# Patient Record
Sex: FEMALE | Race: WHITE | NOT HISPANIC OR LATINO | Employment: PART TIME | URBAN - METROPOLITAN AREA
[De-identification: names, ages, dates, MRNs, and addresses within clinical notes are randomized per-mention and may not be internally consistent; named-entity substitution may affect disease eponyms.]

---

## 2018-06-06 LAB
COLOGUARD RESULT REPORTABLE: NEGATIVE
EXTERNAL COLOGUARD RESULT: NEGATIVE

## 2022-02-16 ENCOUNTER — TELEPHONE (OUTPATIENT)
Dept: GASTROENTEROLOGY | Facility: CLINIC | Age: 57
End: 2022-02-16

## 2022-02-16 RX ORDER — HYDROCORTISONE 5 MG/1
5 TABLET ORAL DAILY
COMMUNITY

## 2022-02-16 RX ORDER — MECLIZINE HCL 12.5 MG/1
TABLET ORAL 3 TIMES DAILY PRN
COMMUNITY

## 2022-02-16 RX ORDER — ESZOPICLONE 1 MG/1
1 TABLET, FILM COATED ORAL
COMMUNITY

## 2022-02-24 ENCOUNTER — OFFICE VISIT (OUTPATIENT)
Dept: GASTROENTEROLOGY | Facility: CLINIC | Age: 57
End: 2022-02-24
Payer: COMMERCIAL

## 2022-02-24 VITALS
BODY MASS INDEX: 19.15 KG/M2 | HEIGHT: 67 IN | SYSTOLIC BLOOD PRESSURE: 110 MMHG | DIASTOLIC BLOOD PRESSURE: 68 MMHG | WEIGHT: 122 LBS | HEART RATE: 60 BPM

## 2022-02-24 DIAGNOSIS — R10.30 LOWER ABDOMINAL PAIN: Primary | ICD-10-CM

## 2022-02-24 DIAGNOSIS — K64.9 HEMORRHOIDS, UNSPECIFIED HEMORRHOID TYPE: ICD-10-CM

## 2022-02-24 DIAGNOSIS — Z80.0 FAMILY HISTORY OF COLON CANCER: ICD-10-CM

## 2022-02-24 DIAGNOSIS — K62.5 RECTAL BLEEDING: ICD-10-CM

## 2022-02-24 PROCEDURE — 99203 OFFICE O/P NEW LOW 30 MIN: CPT | Performed by: NURSE PRACTITIONER

## 2022-02-24 RX ORDER — THYROID 30 MG/1
30 TABLET ORAL DAILY
COMMUNITY
Start: 2022-02-16

## 2022-02-24 RX ORDER — PAROXETINE HYDROCHLORIDE 20 MG/1
20 TABLET, FILM COATED ORAL EVERY MORNING
COMMUNITY
Start: 2022-02-15

## 2022-02-24 RX ORDER — FLUCONAZOLE 100 MG/1
100 TABLET ORAL DAILY
COMMUNITY
Start: 2022-02-15

## 2022-02-24 RX ORDER — PROGESTERONE 100 MG/1
CAPSULE ORAL
COMMUNITY
Start: 2022-02-10

## 2022-02-24 NOTE — PROGRESS NOTES
[facility] - Outpatient Follow-up Note  Stella Kaiser 55 y o  female MRN: [id number]  Encounter: [id number]    ASSESSMENT AND PLAN:      1  Lower abdominal pain  Patient states she has been having lower abdominal pain  She states increased discomfort after eating  States consistency of her bowel movements have been loose to formed  States she does have some nausea with meals, denies vomiting  Consider IBS, gastritis, less likely celiacs since patient does have a gluten sensitivity and has been gluten free for quite some time  Discussed fiber intake and also FODMAP diet however will re-evaluate after colonoscopy  - schedule colonoscopy at [facility])    2  Rectal bleeding  3  Hemorrhoids, unspecified hemorrhoid type  Patient states she has had rectal bleeding with blood to the toilet tissue  She states she also has 2 external hemorrhoids which have recently been very painful  Her pain is very high at this time and would prefer to have her painful external hemorrhoids addressed prior to colonoscopy  Discussed options for treatment and she will call Dr Nelson Baird for an appointment  If she cannot get in in a reasonable amount of time she will let the office know and we will try to get her in with a [facility] practice sooner  - Ambulatory Referral to Colorectal Surgery; Future    4  Family history of colon cancer  States she has done a Cologuard in the last 2-3 years however her father did have colon cancer and she is willing to have a colonoscopy        Followup Appointment:  2-3 months, after seeing surgery for external hemorrhoid removal   ______________________________________________________________________    Chief Complaint   Patient presents with    Hemorrhoids     Referred by OZIEL Wadsworth     HPI:  15-year-old female with medical history of C difficile, Lyme disease, gluten sensitivity, dairy sensitivity rectal bleeding, external hemorrhoid pain and abdominal pain  Patient states that she has 2 external hemorrhoids that have become extremely painful  She states she does have some nausea after meals  Denies vomiting  Denies acid reflux symptoms  States she is having lower abdomen discomfort increased after eating  She states she is having loose to formed bowel movements daily  She states she does have occasional hematochezia on the toilet tissue however denies melena  Most recent lab work 01/06/2022; CBC, CMP, TSH normal   She states she has only done a Cologuard in the past 2-3 years which was negative  She does state her father did have colon cancer  Patient states she does have increased muscle skeletal pain with history of Lyme  Nonsmoker, denies ETOH, states she does medical marijuana daily mostly in either a tincture or inhaled      Historical Information   Past Medical History:   Diagnosis Date    Abnormal EKG     Adrenal insufficiency (HCC)     Anxiety     Chronic pain     Depression     Insomnia     Lyme carditis     Pericarditis      Past Surgical History:   Procedure Laterality Date    HYSTERECTOMY  2015     Social History     Substance and Sexual Activity   Alcohol Use Not Currently    Alcohol/week: 0 0 standard drinks    Comment: 1-2 daily     Social History     Substance and Sexual Activity   Drug Use Yes    Frequency: 7 0 times per week    Types: Marijuana    Comment: Pain management/sleep     Social History     Tobacco Use   Smoking Status Never Smoker   Smokeless Tobacco Never Used     Family History   Problem Relation Age of Onset    Lupus Mother     Colon cancer Father     Testicular cancer Father     Asthma Father     Colon polyps Neg Hx          Current Outpatient Medications:     [place] Thyroid 30 MG tablet    eszopiclone (LUNESTA) 1 mg tablet    fluconazole (DIFLUCAN) 100 mg tablet    hydrocortisone (CORTEF) 5 mg tablet    PARoxetine (PAXIL) 20 mg tablet    Progesterone 100 MG CAPS    meclizine (ANTIVERT) 12 5 MG tablet    progesterone (ENDOMETRIN) 100 MG vaginal insert  No Known Allergies  Reviewed medications and allergies and updated as indicated    PHYSICAL EXAM:    Blood pressure 110/68, pulse 60, height 5' 7" (1 702 m), weight 55 3 kg (122 lb)  Body mass index is 19 11 kg/m²  General Appearance: NAD, cooperative, alert  Eyes: Anicteric, PERRLA, EOMI  ENT:  Normocephalic, atraumatic, normal mucosa  Neck:  Supple, symmetrical, trachea midline  Resp:  Clear to auscultation bilaterally; no rales, rhonchi or wheezing; respirations unlabored   CV:  S1 S2, Regular rate and rhythm; no murmur, rub, or gallop  GI:  Soft, lower abdomen tenderness, non-distended; normal bowel sounds; no masses, no organomegaly   Rectal: Deferred  Musculoskeletal: No cyanosis, clubbing or edema  Normal ROM  Skin:  No jaundice, rashes, or lesions   Heme/Lymph: No palpable cervical lymphadenopathy  Psych: Normal affect, good eye contact  Neuro: No gross deficits, AAOx3    Lab Results:   No results found for: WBC, HGB, HCT, MCV, PLT  No results found for: NA, K, CL, CO2, ANIONGAP, BUN, CREATININE, GLUCOSE, GLUF, CALCIUM, CORRECTEDCA, AST, ALT, ALKPHOS, PROT, BILITOT, EGFR  No results found for: IRON, TIBC, FERRITIN  No results found for: LIPASE    Radiology Results:   No results found

## 2022-03-01 ENCOUNTER — TELEPHONE (OUTPATIENT)
Dept: GASTROENTEROLOGY | Facility: CLINIC | Age: 57
End: 2022-03-01

## 2022-03-08 ENCOUNTER — TELEPHONE (OUTPATIENT)
Dept: GASTROENTEROLOGY | Facility: CLINIC | Age: 57
End: 2022-03-08

## 2022-03-24 DIAGNOSIS — K62.5 RECTAL HEMORRHAGE: Primary | ICD-10-CM

## 2022-03-25 RX ORDER — SODIUM PICOSULFATE, MAGNESIUM OXIDE, AND ANHYDROUS CITRIC ACID 10; 3.5; 12 MG/160ML; G/160ML; G/160ML
LIQUID ORAL
Qty: 320 ML | Refills: 0 | Status: SHIPPED | OUTPATIENT
Start: 2022-03-25 | End: 2022-03-31 | Stop reason: HOSPADM

## 2022-03-25 NOTE — TELEPHONE ENCOUNTER
Scheduled date of colonoscopy (as of today):3/31/22  Physician performing colonoscopy: Dr Joy Abt  [facility] of colonoscopy[facility]  Bowel prep reviewed with patient: Clenpiq  Instructions reviewed with patient by: Kodi Daily  Clearances: none

## 2022-03-31 ENCOUNTER — ANESTHESIA EVENT (OUTPATIENT)
Dept: GASTROENTEROLOGY | Facility: AMBULATORY SURGERY CENTER | Age: 57
End: 2022-03-31

## 2022-03-31 ENCOUNTER — ANESTHESIA (OUTPATIENT)
Dept: GASTROENTEROLOGY | Facility: AMBULATORY SURGERY CENTER | Age: 57
End: 2022-03-31

## 2022-03-31 ENCOUNTER — HOSPITAL ENCOUNTER (OUTPATIENT)
Dept: GASTROENTEROLOGY | Facility: AMBULATORY SURGERY CENTER | Age: 57
Discharge: HOME | End: 2022-03-31
Payer: COMMERCIAL

## 2022-03-31 VITALS
BODY MASS INDEX: 19.03 KG/M2 | OXYGEN SATURATION: 100 % | HEART RATE: 50 BPM | WEIGHT: 121.25 LBS | SYSTOLIC BLOOD PRESSURE: 106 MMHG | HEIGHT: 67 IN | TEMPERATURE: 98.2 F | RESPIRATION RATE: 28 BRPM | DIASTOLIC BLOOD PRESSURE: 61 MMHG

## 2022-03-31 DIAGNOSIS — Z80.0 FAMILY HISTORY OF COLON CANCER: ICD-10-CM

## 2022-03-31 DIAGNOSIS — R10.30 LOWER ABDOMINAL PAIN: ICD-10-CM

## 2022-03-31 PROCEDURE — 45378 DIAGNOSTIC COLONOSCOPY: CPT | Performed by: INTERNAL MEDICINE

## 2022-03-31 RX ORDER — LIDOCAINE HYDROCHLORIDE 10 MG/ML
INJECTION, SOLUTION EPIDURAL; INFILTRATION; INTRACAUDAL; PERINEURAL AS NEEDED
Status: DISCONTINUED | OUTPATIENT
Start: 2022-03-31 | End: 2022-03-31

## 2022-03-31 RX ORDER — PROPOFOL 10 MG/ML
INJECTION, EMULSION INTRAVENOUS AS NEEDED
Status: DISCONTINUED | OUTPATIENT
Start: 2022-03-31 | End: 2022-03-31

## 2022-03-31 RX ORDER — SODIUM CHLORIDE, SODIUM LACTATE, POTASSIUM CHLORIDE, CALCIUM CHLORIDE 600; 310; 30; 20 MG/100ML; MG/100ML; MG/100ML; MG/100ML
50 INJECTION, SOLUTION INTRAVENOUS CONTINUOUS
Status: DISCONTINUED | OUTPATIENT
Start: 2022-03-31 | End: 2022-04-04 | Stop reason: HOSPADM

## 2022-03-31 RX ADMIN — PROPOFOL 30 MG: 10 INJECTION, EMULSION INTRAVENOUS at 16:59

## 2022-03-31 RX ADMIN — PROPOFOL 70 MG: 10 INJECTION, EMULSION INTRAVENOUS at 16:54

## 2022-03-31 RX ADMIN — SODIUM CHLORIDE, SODIUM LACTATE, POTASSIUM CHLORIDE, CALCIUM CHLORIDE 50 ML/HR: 600; 310; 30; 20 INJECTION, SOLUTION INTRAVENOUS at 16:08

## 2022-03-31 RX ADMIN — PROPOFOL 50 MG: 10 INJECTION, EMULSION INTRAVENOUS at 17:09

## 2022-03-31 RX ADMIN — PROPOFOL 30 MG: 10 INJECTION, EMULSION INTRAVENOUS at 17:13

## 2022-03-31 RX ADMIN — LIDOCAINE HYDROCHLORIDE 50 MG: 10 INJECTION, SOLUTION EPIDURAL; INFILTRATION; INTRACAUDAL; PERINEURAL at 16:54

## 2022-03-31 RX ADMIN — PROPOFOL 50 MG: 10 INJECTION, EMULSION INTRAVENOUS at 17:04

## 2022-03-31 NOTE — DISCHARGE INSTRUCTIONS
Colonoscopy   WHAT YOU NEED TO KNOW:   A colonoscopy is a procedure to examine the inside of your colon (intestine) with a scope  Polyps or tissue growths may have been removed during your colonoscopy  It is normal to feel bloated and to have some abdominal discomfort  You should be passing gas  If you have hemorrhoids or you had polyps removed, you may have a small amount of bleeding  DISCHARGE INSTRUCTIONS:   Seek care immediately if:    You have sudden, severe abdominal pain   You have problems swallowing   You have a large amount of black, sticky bowel movements or blood in your bowel movements   You have sudden trouble breathing   You feel weak, lightheaded, or faint or your heart beats faster than normal for you  Contact your healthcare provider if:    You have a fever and chills   You have nausea or are vomiting   Your abdomen is bloated or feels full and hard   You have abdominal pain   You have black, sticky bowel movements or blood in your bowel movements   You have not had a bowel movement for 3 days after your procedure   You have rash or hives   You have questions or concerns about your procedure  Activity:    Do not lift, strain, or run for 24 hours after your procedure   Rest after your procedure  You have been given medicine to relax you  Do not drive or make important decisions until the day after your procedure  Return to your normal activity as directed   Relieve gas and discomfort from bloating by lying on your right side with a heating pad on your abdomen  You may need to take short walks to help the gas move out  Eat small meals until bloating is relieved  Follow up with your healthcare provider as directed: Write down your questions so you remember to ask them during your visits  If you take a blood thinner, please review the specific instructions from your endoscopist about when you should resume it   These can be found in the Recommendation and Your Medication list sections of this After Visit Summary  Hemorrhoids   WHAT YOU NEED TO KNOW:   What are hemorrhoids? Hemorrhoids are swollen blood vessels inside your rectum (internal hemorrhoids) or on your anus (external hemorrhoids)  Sometimes a hemorrhoid may prolapse  This means it extends out of your anus  What increases my risk for hemorrhoids? · Pregnancy or obesity    · Straining or sitting for a long time during bowel movements    · Liver disease    · Weak muscles around the anus caused by older age, rectal surgery, or anal intercourse    · A lack of physical activity    · Chronic diarrhea or constipation    · A low-fiber diet    What are the signs and symptoms of hemorrhoids? · Pain or itching around your anus or inside your rectum    · Swelling or bumps around your anus    · Bright red blood in your bowel movement, on the toilet paper, or in the toilet bowl    · Tissue bulging out of your anus (prolapsed hemorrhoids)    · Incontinence (poor control over urine or bowel movements)    How are hemorrhoids diagnosed? Your healthcare provider will ask about your symptoms, the foods you eat, and your bowel movements  He or she will examine your anus for external hemorrhoids  You may need the following:  · A digital rectal exam  is a test to check for hemorrhoids  Your healthcare provider will put a gloved finger inside your anus to feel for the hemorrhoids  · An anoscopy  is a test that uses a scope (small tube with a light and camera on the end) to look at your hemorrhoids  How are hemorrhoids treated? Treatment will depend on your symptoms  You may need any of the following:  · Medicines  can help decrease pain and swelling, and soften your bowel movement  The medicine may be a pill, pad, cream, or ointment  · Procedures  may be used to shrink or remove your hemorrhoid  Examples include rubber-band ligation, sclerotherapy, and photocoagulation   These procedures may be done in your healthcare provider's office  Ask your healthcare provider for more information about these procedures  · Surgery  may be needed to shrink or remove your hemorrhoids  How can I manage my symptoms? · Apply ice on your anus for 15 to 20 minutes every hour or as directed  Use an ice pack, or put crushed ice in a plastic bag  Cover it with a towel before you apply it to your anus  Ice helps prevent tissue damage and decreases swelling and pain  · Take a sitz bath  Fill a bathtub with 4 to 6 inches of warm water  You may also use a sitz bath pan that fits inside a toilet bowl  Sit in the sitz bath for 15 minutes  Do this 3 times a day, and after each bowel movement  The warm water can help decrease pain and swelling  · Keep your anal area clean  Gently wash the area with warm water daily  Soap may irritate the area  After a bowel movement, wipe with moist towelettes or wet toilet paper  Dry toilet paper can irritate the area  How can I help prevent hemorrhoids? · Do not strain to have a bowel movement  Do not sit on the toilet too long  These actions can increase pressure on the tissues in your rectum and anus  · Drink plenty of liquids  Liquids can help prevent constipation  Ask how much liquid to drink each day and which liquids are best for you  · Eat a variety of high-fiber foods  Examples include fruits, vegetables, and whole grains  Ask your healthcare provider how much fiber you need each day  You may need to take a fiber supplement  · Exercise as directed  Exercise, such as walking, may make it easier to have a bowel movement  Ask your healthcare provider to help you create an exercise plan  · Do not have anal sex  Anal sex can weaken the skin around your rectum and anus  · Avoid heavy lifting  This can cause straining and increase your risk for another hemorrhoid  When should I seek immediate care?    · You have severe pain in your rectum or around your anus  · You have severe pain in your abdomen and you are vomiting  · You have bleeding from your anus that soaks through your underwear  When should I contact my healthcare provider? · You have frequent and painful bowel movements  · Your hemorrhoid looks or feels more swollen than usual      · You do not have a bowel movement for 2 days or more  · You see or feel tissue coming through your anus  · You have questions or concerns about your condition or care  CARE AGREEMENT:   You have the right to help plan your care  Learn about your health condition and how it may be treated  Discuss treatment options with your healthcare providers to decide what care you want to receive  You always have the right to refuse treatment  The above information is an  only  It is not intended as medical advice for individual conditions or treatments  Talk to your doctor, nurse or pharmacist before following any medical regimen to see if it is safe and effective for you  © Copyright [employer] 2022 Information is for End User's use only and may not be sold, redistributed or otherwise used for commercial purposes   All illustrations and images included in CareNotes® are the copyrighted property of A D A M , Inc  or [address] [place] [address] [employer]

## 2022-03-31 NOTE — ANESTHESIA PREPROCEDURE EVALUATION
Procedure:  COLONOSCOPY    Relevant Problems   No relevant active problems     Abnormal EKG    Insomnia    Pericarditis    Depression    Anxiety    Adrenal insufficiency (HCC)    Lyme carditis    Chronic pain     Migraine today- resolving with excedrin       Physical Exam    Airway    Mallampati score: II  TM Distance: >3 FB  Neck ROM: full     Dental   No notable dental hx     Cardiovascular  Cardiovascular exam normal    Pulmonary  Pulmonary exam normal     Other Findings        Anesthesia Plan  ASA Score- 3     Anesthesia Type- IV sedation with anesthesia with ASA Monitors  Additional Monitors:   Airway Plan:           Plan Factors-Exercise tolerance (METS): >4 METS  Chart reviewed  Patient summary reviewed  Patient is a current smoker (marijuana)  Induction- intravenous  Postoperative Plan-     Informed Consent- Anesthetic plan and risks discussed with patient  I personally reviewed this patient with the CRNA  Discussed and agreed on the Anesthesia Plan with the CRNA  Omayra Tucker

## 2022-03-31 NOTE — ANESTHESIA POSTPROCEDURE EVALUATION
Post-Op Assessment Note    CV Status:  Stable  Pain Score: 1    Pain management: adequate     Mental Status:  Alert and awake   Hydration Status:  Euvolemic   PONV Controlled:  Controlled   Airway Patency:  Patent      Post Op Vitals Reviewed: Yes      Staff: Anesthesiologist         No complications documented      BP   98/53   Temp      Pulse 51   Resp   12   SpO2   100

## 2022-03-31 NOTE — H&P
History and Physical - [facility]  Felipa Ibarra 55 y o  female MRN: [id number]    HPI: Felipa Ibarra is a 55y o  year old female who presents for colonoscopy to evaluate for lower abdominal pain, rectal bleeding also has family history of colorectal cancer    REVIEW OF SYSTEMS: Per the HPI, and otherwise unremarkable      Historical Information   Past Medical History:   Diagnosis Date    Abnormal EKG     Adrenal insufficiency (HCC)     Anxiety     Chronic pain     Depression     Insomnia     Lyme carditis     Migraines     Pericarditis      Past Surgical History:   Procedure Laterality Date    HYSTERECTOMY  2015     Social History   Social History     Substance and Sexual Activity   Alcohol Use Not Currently    Alcohol/week: 0 0 standard drinks    Comment: 1-2 daily     Social History     Substance and Sexual Activity   Drug Use Yes    Frequency: 7 0 times per week    Types: Marijuana    Comment: Pain management/sleep     Social History     Tobacco Use   Smoking Status Never Smoker   Smokeless Tobacco Never Used     Family History   Problem Relation Age of Onset    Lupus Mother     Colon cancer Father     Testicular cancer Father     Asthma Father     Colon polyps Neg Hx        Meds/Allergies       Current Outpatient Medications:     [place] Thyroid 30 MG tablet    hydrocortisone (CORTEF) 5 mg tablet    PARoxetine (PAXIL) 20 mg tablet    Progesterone 100 MG CAPS    Sod Picosulfate-Mag Ox-Cit Acd (Clenpiq) 10-3 5-12 MG-GM -GM/160ML SOLN    eszopiclone (LUNESTA) 1 mg tablet    fluconazole (DIFLUCAN) 100 mg tablet    meclizine (ANTIVERT) 12 5 MG tablet    progesterone (ENDOMETRIN) 100 MG vaginal insert    Current Facility-Administered Medications:     lactated ringers infusion, 50 mL/hr, Intravenous, Continuous, 50 mL/hr at 03/31/22 1608    No Known Allergies    Objective     /55   Pulse (!) 54   Temp 98 2 °F (36 8 °C) (Temporal)   Resp 16   Ht 5' 7" (1 702 m)   Wt 55 kg (121 lb 4 1 oz)   SpO2 100%   BMI 18 99 kg/m²     PHYSICAL EXAM    Gen: NAD AAOx3  Head: Normocephalic, Atraumatic  CV: S1S2 RRR no m/r/g  CHEST: Clear b/l no c/r/w  ABD: soft, +BS NT/ND  EXT: no edema    ASSESSMENT/PLAN:  This is a 55y o  year old female here for colonoscopy, and she is stable and optimized for her procedure

## 2023-09-13 LAB — HBA1C MFR BLD HPLC: 5.3 %

## 2023-11-08 ENCOUNTER — TELEPHONE (OUTPATIENT)
Dept: PAIN MEDICINE | Facility: CLINIC | Age: 58
End: 2023-11-08

## 2023-11-08 NOTE — TELEPHONE ENCOUNTER
Consultation Request from Anthony Mckinney received via fax     Back & Hip pain - Consult or Direct?  Please advise    Paperwork scanned into Media

## 2023-11-09 NOTE — TELEPHONE ENCOUNTER
PRE OP INSTRUCTIONS:           Hold medication  x _ full days prior, last dose on _           Patient advised to hold medication from Date till their appointment at that time instructions to restart will be given. Patient stated verbal understanding. Aware that nursing may/will call to review hold dates as well. -If you are on prescription blood thinners, you may have to hold the medication for several days before the procedure. Please call the office to discuss medication holds at [phone].    -Do not eat or drink ONE HOUR prior to your procedure. If you are diabetic, may follow regular breakfast/lunch schedule and take usual    diabetic medications.  -Lumbar( low back) procedure, please wear comfortable slacks/pants.  -Cervical (neck) procedure, please wear a shirt/blouse that is easy to remove.  -A  is required to take you home form your procedure.  -Continue all to take prescribed medication the day of your procedure, including blood pressure medications.  -If you are prescribe antibiotics, have an active infection or have an open wound, please contact the office at [phone].  -Please refrain from any vaccinations two weeks before and two weeks after injection.  -Insurance authorization received in not a guarantee of payment per your insurance company's authorization disclaimer and it is    your responsibility to verify your benefits. -If you have any questions about the instructions, please call me at [phone]          -PATIENT INSTRUCTED TO STOP ALL NSAID'S 4 DAYS PRIOR TO PROCEDURE            EXCEPT FOR IBUPROFEN IT CAN BE TAKEN UP TO 24 HOURS PRIOR TO PROCEDURE. MBB PRE OP INSTRUCTIONS:             Please do not eat or drink 1 hour prior to the procedure. If you are not feeling well, have any kind of infection or are placed on antibiotics for any reason, please call the office,           we will have to reschedule your procedure. Patient on ABX procedure canceled till off ABX and S/S free for 48 hours          You will need a , 18 years or older. Reviewed instructions: , NPO 1 hour prior, loose-fitting/comfortable clothes, if ill/fever/infx/abx to call and reschedule. Also pain level at leat 5/10 if it is not please call and talk to nurse [phone]. Please continue to take any long acting pain medication as prescribed. Refrain from PRN, as-needed pain meds 6h prior and 6-8 hours after anything you would buy over the counter at a store. It is recommended that you try to continue with your normal activities of daily living to see if the medial branch block is helping. You will be sent home with a pain diary that you will need to return to us either by dropping it off, mailing it in, faxing it or you can           upload it to your my chart for the doctor to review to allow us to assess the next steps in your treatment. Patient called back and scheduled for Procedure    All pre procedure instructions were given to patient   Nothing to eat or drink for 1 hour prior  Loose fitting clothing   PATIENT INSTRUCTED TO STOP ALL NSAID'S 4 DAYS PRIOR TO PROCEDURE EXCEPT FOR IBUPROFEN IT CAN BE TAKEN UP TO 24 HOURS PRIOR TO PROCEDURE.    DENIES ANTICOAG'S OR ASA   Denies Antibx   Needs    Patient instructed to contact our office if becomes sick or gets put on any ANTIBIOTIC or has any active infections   Refrain from any vaccines 2 weeks before & 2 weeks after  Insurance auth received but is not a guarantee of payment per your insurance company's authorization disclaimer and it is your responsibility to verify your benefits   COVID -19 screening complete

## 2023-11-09 NOTE — TELEPHONE ENCOUNTER
Caller: Yonatan Chapman     Doctor: Dr Jassi Valladares    Reason for call: Patient returning a call from  please advise     Call back#: [phone]

## 2023-11-30 ENCOUNTER — HOSPITAL ENCOUNTER (OUTPATIENT)
Dept: RADIOLOGY | Facility: CLINIC | Age: 58
Discharge: HOME | End: 2023-11-30
Payer: COMMERCIAL

## 2023-11-30 VITALS
DIASTOLIC BLOOD PRESSURE: 58 MMHG | RESPIRATION RATE: 18 BRPM | HEART RATE: 88 BPM | OXYGEN SATURATION: 94 % | SYSTOLIC BLOOD PRESSURE: 101 MMHG | TEMPERATURE: 97.9 F

## 2023-11-30 DIAGNOSIS — M70.62 GREATER TROCHANTERIC BURSITIS OF LEFT HIP: ICD-10-CM

## 2023-11-30 PROCEDURE — 77002 NEEDLE LOCALIZATION BY XRAY: CPT | Performed by: ANESTHESIOLOGY

## 2023-11-30 PROCEDURE — 20610 DRAIN/INJ JOINT/BURSA W/O US: CPT | Performed by: ANESTHESIOLOGY

## 2023-11-30 RX ORDER — METHYLPREDNISOLONE ACETATE 80 MG/ML
80 INJECTION, SUSPENSION INTRA-ARTICULAR; INTRALESIONAL; INTRAMUSCULAR; PARENTERAL; SOFT TISSUE ONCE
Status: COMPLETED | OUTPATIENT
Start: 2023-11-30 | End: 2023-11-30

## 2023-11-30 RX ORDER — ROPIVACAINE HYDROCHLORIDE 2 MG/ML
2 INJECTION, SOLUTION EPIDURAL; INFILTRATION; PERINEURAL ONCE
Status: COMPLETED | OUTPATIENT
Start: 2023-11-30 | End: 2023-11-30

## 2023-11-30 RX ADMIN — METHYLPREDNISOLONE ACETATE 80 MG: 80 INJECTION, SUSPENSION INTRA-ARTICULAR; INTRALESIONAL; INTRAMUSCULAR; SOFT TISSUE at 13:59

## 2023-11-30 RX ADMIN — ROPIVACAINE HYDROCHLORIDE 2 ML: 2 INJECTION, SOLUTION EPIDURAL; INFILTRATION at 13:59

## 2023-11-30 RX ADMIN — IOHEXOL 1 ML: 300 INJECTION, SOLUTION INTRAVENOUS at 13:59

## 2023-11-30 NOTE — DISCHARGE INSTRUCTIONS
Do not apply heat to any area that is numb. If you have discomfort or soreness at the injection site, you may apply ice today, 20 minutes on and 20 minutes off. Tomorrow you may use ice or warm, moist heat. Do not apply ice or heat directly to the skin. If you experience severe shortness of breath, go to the Emergency Room. You may have numbness for several hours from the local anesthetic. Please use caution and common sense, especially with weight-bearing activities. You may have an increase or change in the discomfort for 36-48 hours after your treatment. Apply ice and continue with any pain medicine you have been prescribed. Do not do anything strenuous today. You may shower, but no tub baths or hot tubs today. You may resume your normal activities tomorrow, but do not “overdo it”. Resume normal activities slowly when you are feeling better. If you experience redness, drainage or swelling at the injection site, or if you develop a fever above 100 degrees, please call [facility] at [phone] or go to the Emergency Room. Continue to take all routine medicines prescribed by your primary care physician unless otherwise instructed by our staff. Most blood thinners should be started again according to your regularly scheduled dosing. If you have any questions, please give our office a call. As no general anesthesia was used in today's procedure, you should not experience any side effects related to anesthesia. If you have a problem specifically related to your procedure, please call our office at [phone]. Problems not related to your procedure should be directed to your primary care physician.

## 2023-11-30 NOTE — H&P
History of Present Illness: The patient is a 63 y.o. female who presents with complaints of hip pain.     Past Medical History:   Diagnosis Date    Abnormal EKG     Adrenal insufficiency (HCC)     Anxiety     Chronic pain     Depression     Insomnia     Lyme carditis     Migraines     Pericarditis        Past Surgical History:   Procedure Laterality Date    HYSTERECTOMY  2015         Current Outpatient Medications:     [place] Thyroid 30 MG tablet, Take 30 mg by mouth daily, Disp: , Rfl:     eszopiclone (LUNESTA) 1 mg tablet, Take 1 mg by mouth daily at bedtime as needed for sleep Take immediately before bedtime (Patient not taking: Reported on 3/31/2022 ), Disp: , Rfl:     fluconazole (DIFLUCAN) 100 mg tablet, Take 100 mg by mouth daily (Patient not taking: Reported on 3/31/2022 ), Disp: , Rfl:     hydrocortisone (CORTEF) 5 mg tablet, Take 5 mg by mouth daily, Disp: , Rfl:     meclizine (ANTIVERT) 12.5 MG tablet, Take by mouth 3 (three) times a day as needed for dizziness (Patient not taking: Reported on 2/24/2022 ), Disp: , Rfl:     PARoxetine (PAXIL) 20 mg tablet, Take 20 mg by mouth every morning, Disp: , Rfl:     progesterone (ENDOMETRIN) 100 MG vaginal insert, Insert 100 mg into the vagina 2 (two) times a day (Patient not taking: Reported on 2/24/2022 ), Disp: , Rfl:     Progesterone 100 MG CAPS, TAKE 2 CAPSULES ORALLY DAILY AT BEDTIME, Disp: , Rfl:     Current Facility-Administered Medications:     iohexol (OMNIPAQUE) 300 mg/mL injection 1 mL, 1 mL, Intra-articular, Once, Clem Thrasher DO    methylPREDNISolone acetate (DEPO-MEDROL) injection 80 mg, 80 mg, Intra-articular, Once, Clem Thrasher DO    ropivacaine (NAROPIN) injection 2 mL, 2 mL, Intra-articular, Once, Clem Thrasher DO    No Known Allergies    Physical Exam:   General: Awake, Alert, Oriented x 3, Mood and affect appropriate  Respiratory: Respirations even and unlabored  Cardiovascular: Peripheral pulses intact; no edema  Musculoskeletal Exam: Normal gait    ASA Score: II         Assessment:   1.  Greater trochanteric bursitis of left hip        Plan: LT GTB INJ

## 2023-12-07 ENCOUNTER — TELEPHONE (OUTPATIENT)
Dept: PAIN MEDICINE | Facility: CLINIC | Age: 58
End: 2023-12-07

## 2023-12-11 NOTE — TELEPHONE ENCOUNTER
Pt reports no improvement post inj   Pain level 7/10  Pt aware I will call next week for an update

## 2023-12-15 ENCOUNTER — TELEPHONE (OUTPATIENT)
Age: 58
End: 2023-12-15

## 2023-12-15 NOTE — TELEPHONE ENCOUNTER
VM for patient to r/s 1/17 appointment in [place] office with Dr Abelina Cranker. Waiting response from patient.

## 2024-01-16 ENCOUNTER — OFFICE VISIT (OUTPATIENT)
Age: 59
End: 2024-01-16
Payer: COMMERCIAL

## 2024-01-16 ENCOUNTER — TELEPHONE (OUTPATIENT)
Age: 59
End: 2024-01-16

## 2024-01-16 VITALS
SYSTOLIC BLOOD PRESSURE: 100 MMHG | DIASTOLIC BLOOD PRESSURE: 64 MMHG | WEIGHT: 109 LBS | BODY MASS INDEX: 17.52 KG/M2 | HEIGHT: 66 IN

## 2024-01-16 DIAGNOSIS — K60.2 ANAL FISSURE: Primary | ICD-10-CM

## 2024-01-16 DIAGNOSIS — Z12.11 SCREENING FOR COLON CANCER: ICD-10-CM

## 2024-01-16 DIAGNOSIS — K59.04 CHRONIC IDIOPATHIC CONSTIPATION: ICD-10-CM

## 2024-01-16 PROCEDURE — 99214 OFFICE O/P EST MOD 30 MIN: CPT | Performed by: NURSE PRACTITIONER

## 2024-01-16 RX ORDER — CLOBETASOL PROPIONATE 0.5 MG/G
1 CREAM TOPICAL 2 TIMES DAILY
COMMUNITY
Start: 2023-12-12

## 2024-01-16 RX ORDER — TRIAMCINOLONE ACETONIDE 1 MG/G
CREAM TOPICAL
COMMUNITY
Start: 2023-12-12

## 2024-01-16 RX ORDER — TRETINOIN 0.5 MG/G
CREAM TOPICAL
COMMUNITY
Start: 2023-12-21

## 2024-01-16 NOTE — PATIENT INSTRUCTIONS
Stop fiber supplement  Take MiraLAX daily-start with 1 capful 2 times per day for at least 1 week.  If your bowel movements are softer, okay to decrease 1x daily  Okay to continue stool softener daily  High-fiber diet and be sure to drink at least 64 ounces of water daily.  Try to walk daily  Schedule colonoscopy

## 2024-01-16 NOTE — PROGRESS NOTES
[facility] - Outpatient Follow-up Note  Toya Kramer 58 y.o. female MRN: [id number]  Encounter: [id number]    ASSESSMENT AND PLAN:      1. Chronic idiopathic constipation  Longstanding history of intermittent constipation.  Presents today with 3 to 4-week of very hard small bowel movements, straining to defecate and severe rectal pain with defecation due to to anal fissures  Reports almost daily hematochezia and bright red blood with wiping  Mild abdominal discomfort  No improvement with stool softener and herbal GI supplement  May be element of IBS versus slow transit constipation  Recommend proceeding with colonoscopy to rule out stricture, mass or large polyp  -Scheduled for colonoscopy at [facility] with 2-day bowel prep due to poor prep previously  -Start MiraLAX 1 capful twice daily for at least 1 week, can titrate dosing to promote soft bowel movement daily without straining  -Discussed high-fiber diet and increasing daily fluid intake, written instructions provided  -Consider pelvic floor PT if no improvement at next office visit  -Consider adding prescription guanylate cyclase-c agonist such as Linzess or Amitiza    2.  Anal fissures  Follows with colorectal surgeon Dr. Ngo at [facility] for anal fissures  No improvement with topical diltiazem ointment or Botox injections  Continues to experience rectal pain with defecation  -Will treat constipation and hard stools as above    3.  Screening for colon cancer  Family history of colon cancer in her father  Previous Cologuard 3 to 4 years ago was negative  Unsuccessful colonoscopy 2022 due to poor bowel prep.  1 year recall recommended and patient overdue for surveillance  -Scheduled for colonoscopy as above with 2-day prep    Followup Appointment: 3 months  ______________________________________________________________________    Chief Complaint   Patient presents with    Follow-up     Pt has been seeing Dr. Ngo for an  anal fissure and hemorrhoids. Pt is experiencing constipation and discomfort when having bowel movements. Dr. Ngo recommended GI consult to discuss screening colonoscopy.      HPI: 58-year-old female with history of chronic Lyme disease, rectal bleeding likely due to hemorrhoids and anal fissures.    Presents in follow-up for history of chronic constipation.    She has been following with Dr. Ngo for the past year for recurrent anal fissures with no improvement with topical diltiazem and Botox injections  Continues to have severe pain with defecation and almost daily rectal bleeding with blood noted in bowel movement and with wiping    She has struggled for years with intermittent constipation but over the past 3 to 4 weeks has had severe constipation with very hard small bowel movements and straining to defecate.  Reports severe rectal pain with defecation  Currently takes 2 stool softeners daily.  She follows with an herbal specialist and takes multiple herbal supplements including GI remedy that includes slippery elm.  Uses aloe supplement as needed for laxative and occasional fleets enema    Also reports fairly good intake of dietary fiber and drinks at least 64 ounces of water daily  Denies significant abdominal pain-does feel bloated and uncomfortable with no bowel movement after 2 days  Appetite has been decreased as she is currently battling recurrent chronic Lyme's disease.  Has lost approximately 10 pounds over the past year  Denies reflux or GERD symptoms    Prior colonoscopy 3/2022 showed internal hemorrhoid with no bleeding  Poor prep, unable to clear with extensive washing and suctioning and could not exclude polyps.  Recall in 1 year was recommended        Historical Information   Past Medical History:   Diagnosis Date    Abnormal EKG     Adrenal insufficiency (HCC)     Anxiety     Chronic pain     Depression     Insomnia     Lyme carditis     Migraines     Pericarditis      Past Surgical History:  "  Procedure Laterality Date    HYSTERECTOMY  2015     Social History     Substance and Sexual Activity   Alcohol Use Not Currently    Alcohol/week: 0.0 standard drinks of alcohol    Comment: 1-2 daily     Social History     Substance and Sexual Activity   Drug Use Yes    Frequency: 7.0 times per week    Types: Marijuana    Comment: Pain management/sleep     Social History     Tobacco Use   Smoking Status Never    Passive exposure: Never   Smokeless Tobacco Never     Family History   Problem Relation Age of Onset    Lupus Mother     Colon cancer Father     Testicular cancer Father     Asthma Father     Colon polyps Neg Hx          Current Outpatient Medications:     [place] Thyroid 30 MG tablet    clobetasol (TEMOVATE) 0.05 % cream    PARoxetine (PAXIL) 20 mg tablet    Progesterone 100 MG CAPS    tretinoin (REFISSA) 0.05 % cream    triamcinolone (KENALOG) 0.1 % cream    conjugated estrogens (Premarin) 0.45 mg tablet    cyclobenzaprine (FLEXERIL) 10 mg tablet    eszopiclone (LUNESTA) 1 mg tablet    fluconazole (DIFLUCAN) 100 mg tablet    hydrocortisone (CORTEF) 5 mg tablet    meclizine (ANTIVERT) 12.5 MG tablet    progesterone (ENDOMETRIN) 100 MG vaginal insert  No Known Allergies  Reviewed medications and allergies and updated as indicated    PHYSICAL EXAM:    Blood pressure 100/64, height 5' 6\" (1.676 m), weight 49.4 kg (109 lb). Body mass index is 17.59 kg/m².  General Appearance: NAD, cooperative, alert  Eyes: Anicteric  ENT:  Normocephalic, atraumatic, normal mucosa.    Neck:  Supple, symmetrical, trachea midline  Resp:  Clear to auscultation bilaterally; no rales, rhonchi or wheezing; respirations unlabored   CV:  S1 S2, Regular rate and rhythm; no murmur, rub, or gallop.  GI:  Soft, non-tender, non-distended; normal bowel sounds; no masses, no organomegaly   Rectal: Deferred  Musculoskeletal: No cyanosis, clubbing or edema. Normal ROM.  Skin:  No jaundice, rashes, or lesions   Psych: Normal affect, good eye " contact  Neuro: No gross deficits, AAOx3

## 2024-01-16 NOTE — TELEPHONE ENCOUNTER
Scheduled date of colonoscopy (as of today): 2/1/2024  Physician performing colonoscopy: MELANIE  Location of colonoscopy: [facility]  Bowel prep reviewed with patient: 2 Day [place]  Instructions reviewed with patient by: GISELA/FRANCO  Clearances: N

## 2024-01-18 ENCOUNTER — ANESTHESIA EVENT (OUTPATIENT)
Dept: ANESTHESIOLOGY | Facility: AMBULATORY SURGERY CENTER | Age: 59
End: 2024-01-18

## 2024-01-18 ENCOUNTER — ANESTHESIA (OUTPATIENT)
Dept: ANESTHESIOLOGY | Facility: AMBULATORY SURGERY CENTER | Age: 59
End: 2024-01-18

## 2024-01-30 ENCOUNTER — TELEPHONE (OUTPATIENT)
Dept: GASTROENTEROLOGY | Facility: CLINIC | Age: 59
End: 2024-01-30

## 2025-02-25 ENCOUNTER — HOSPITAL ENCOUNTER (OUTPATIENT)
Dept: CARDIOLOGY | Facility: HOSPITAL | Age: 60
Discharge: HOME | End: 2025-02-25
Payer: COMMERCIAL

## 2025-02-25 ENCOUNTER — APPOINTMENT (OUTPATIENT)
Dept: LAB | Facility: HOSPITAL | Age: 60
End: 2025-02-25
Payer: COMMERCIAL

## 2025-02-25 ENCOUNTER — OFFICE VISIT (OUTPATIENT)
Dept: SURGERY | Facility: CLINIC | Age: 60
End: 2025-02-25
Payer: COMMERCIAL

## 2025-02-25 VITALS — HEIGHT: 66 IN | WEIGHT: 118 LBS | BODY MASS INDEX: 18.96 KG/M2

## 2025-02-25 DIAGNOSIS — K60.1 CHRONIC ANAL FISSURE: Primary | ICD-10-CM

## 2025-02-25 DIAGNOSIS — K60.1 CHRONIC ANAL FISSURE: ICD-10-CM

## 2025-02-25 LAB
ANION GAP SERPL CALC-SCNC: 6 MEQ/L (ref 3–15)
BASOPHILS # BLD: 0.05 K/UL (ref 0.01–0.1)
BASOPHILS NFR BLD: 0.8 %
BUN SERPL-MCNC: 13 MG/DL (ref 7–25)
CALCIUM SERPL-MCNC: 10.4 MG/DL (ref 8.6–10.3)
CHLORIDE SERPL-SCNC: 103 MEQ/L (ref 98–107)
CO2 SERPL-SCNC: 30 MEQ/L (ref 21–31)
CREAT SERPL-MCNC: 0.7 MG/DL (ref 0.6–1.2)
DIFFERENTIAL METHOD BLD: ABNORMAL
EGFRCR SERPLBLD CKD-EPI 2021: >60 ML/MIN/1.73M*2
EOSINOPHIL # BLD: 0.07 K/UL (ref 0.04–0.36)
EOSINOPHIL NFR BLD: 1.1 %
ERYTHROCYTE [DISTWIDTH] IN BLOOD BY AUTOMATED COUNT: 13.2 % (ref 11.7–14.4)
GLUCOSE SERPL-MCNC: 87 MG/DL (ref 70–99)
HCT VFR BLD AUTO: 39.2 % (ref 35–45)
HGB BLD-MCNC: 12.5 G/DL (ref 11.8–15.7)
IMM GRANULOCYTES # BLD AUTO: 0.02 K/UL (ref 0–0.08)
IMM GRANULOCYTES NFR BLD AUTO: 0.3 %
LYMPHOCYTES # BLD: 1.42 K/UL (ref 1.2–3.5)
LYMPHOCYTES NFR BLD: 22 %
MCH RBC QN AUTO: 28.6 PG (ref 28–33.2)
MCHC RBC AUTO-ENTMCNC: 31.9 G/DL (ref 32.2–35.5)
MCV RBC AUTO: 89.7 FL (ref 83–98)
MONOCYTES # BLD: 0.63 K/UL (ref 0.28–0.8)
MONOCYTES NFR BLD: 9.8 %
NEUTROPHILS # BLD: 4.25 K/UL (ref 1.7–7)
NEUTS SEG NFR BLD: 66 %
NRBC BLD-RTO: 0 %
PLATELET # BLD AUTO: 276 K/UL (ref 150–369)
PMV BLD AUTO: 12.2 FL (ref 9.4–12.3)
POTASSIUM SERPL-SCNC: 4.3 MEQ/L (ref 3.5–5.1)
RBC # BLD AUTO: 4.37 M/UL (ref 3.93–5.22)
SODIUM SERPL-SCNC: 139 MEQ/L (ref 136–145)
WBC # BLD AUTO: 6.44 K/UL (ref 3.8–10.5)

## 2025-02-25 PROCEDURE — 36415 COLL VENOUS BLD VENIPUNCTURE: CPT

## 2025-02-25 PROCEDURE — 99204 OFFICE O/P NEW MOD 45 MIN: CPT

## 2025-02-25 PROCEDURE — 93005 ELECTROCARDIOGRAM TRACING: CPT

## 2025-02-25 PROCEDURE — 3008F BODY MASS INDEX DOCD: CPT

## 2025-02-25 PROCEDURE — 80048 BASIC METABOLIC PNL TOTAL CA: CPT

## 2025-02-25 PROCEDURE — 85025 COMPLETE CBC W/AUTO DIFF WBC: CPT

## 2025-02-25 RX ORDER — ESTRADIOL 0.05 MG/D
FILM, EXTENDED RELEASE TRANSDERMAL
COMMUNITY
Start: 2025-02-24

## 2025-02-25 RX ORDER — PROGESTERONE 100 MG/1
CAPSULE ORAL
COMMUNITY
Start: 2020-02-24

## 2025-02-25 RX ORDER — HYDROCORTISONE ACETATE 25 MG/1
SUPPOSITORY RECTAL
COMMUNITY
Start: 2025-01-09

## 2025-02-25 RX ORDER — HYDROCORTISONE 5 MG/1
TABLET ORAL
COMMUNITY

## 2025-02-25 RX ORDER — HYDROXYCHLOROQUINE SULFATE 200 MG/1
200 TABLET, FILM COATED ORAL DAILY
COMMUNITY
Start: 2025-01-31

## 2025-02-25 RX ORDER — ESZOPICLONE 2 MG/1
TABLET, FILM COATED ORAL
COMMUNITY

## 2025-02-25 RX ORDER — PAROXETINE HYDROCHLORIDE 20 MG/1
TABLET, FILM COATED ORAL
COMMUNITY
Start: 2017-05-24

## 2025-02-25 RX ORDER — MECLIZINE HCL 12.5 MG 12.5 MG/1
TABLET ORAL 3 TIMES DAILY PRN
COMMUNITY

## 2025-02-25 RX ORDER — SODIUM CHLORIDE 9 MG/ML
INJECTION, SOLUTION INTRAVENOUS CONTINUOUS
Status: CANCELLED | OUTPATIENT
Start: 2025-02-25 | End: 2025-02-26

## 2025-02-25 RX ORDER — LEVOTHYROXINE, LIOTHYRONINE 19; 4.5 UG/1; UG/1
TABLET ORAL
COMMUNITY
Start: 2016-05-24

## 2025-02-25 RX ORDER — TRIAMCINOLONE ACETONIDE 1 MG/G
CREAM TOPICAL
COMMUNITY
Start: 2025-01-14

## 2025-02-25 NOTE — LETTER
February 25, 2025     Lola Dahl, DO  [address]  [address]  [place] [address]    Patient: Cleo Paulson  YOB: 1965  Date of Visit: 2/25/2025      Dear Dr. Dahl:    Thank you for referring Cleo Paulson to me for evaluation. Below are my notes for this consultation.    If you have questions, please do not hesitate to call me. I look forward to following your patient along with you.         Sincerely,        RUDY Cabrera        CC: No Recipients    Rose Marie Davis PA C  2/25/2025  1:42 PM  Sign when Signing Visit      Chief Complaint:   Chief Complaint   Patient presents with   • Anal Fissure       HPI    Patient is a 59 y.o. female who presents with the following:      Pain with BMs x 4 years worsening over past 1 year. Pain occurs with passing of stool and lingers after the BM for 10 minutes.  Associated symptoms include bleeding. Bleeding occurs with every BM and is noted in bowl and on tp. She was given Rx for Nifedipine for 3 months - with no improvement. Also had botox injections which offered no relief.     Has external tissue. Present over past 2 years. Gets in the way of hygiene.    Hygiene - yes   Clothing - yes   Intimacy - no   Swelling - yes; feels swollen right now. Painful to sit.     Has itching - distracting during the day. Uses wet wipes to clean up.     Currently having diarrhea x 2 months. Her doctor is doing stool tests.  Has BMs 5x per day. Previously going 1x per day.   Colonoscopy - never.  Cologuard ~8 years ago.       Medical History:   Past Medical History:   Diagnosis Date   • Disease of thyroid gland        Surgical History:   Past Surgical History   Procedure Laterality Date   • Breast implant removal     • Hysterectomy     • Vein surgery         Social History:   Social History     Socioeconomic History   • Marital status:      Spouse name: Not on file   • Number of children: Not on file   • Years of education: Not on file   •  Highest education level: Not on file   Occupational History   • Not on file   Tobacco Use   • Smoking status: Never   • Smokeless tobacco: Never   Vaping Use   • Vaping status: Never Used   Substance and Sexual Activity   • Alcohol use: Not Currently   • Drug use: Yes     Comment: medical marijuana   • Sexual activity: Defer   Other Topics Concern   • Not on file   Social History Narrative   • Not on file     Social Drivers of Health     Financial Resource Strain: Not on file   Food Insecurity: Not on file   Transportation Needs: Not on file   Physical Activity: Not on file   Stress: Not on file   Social Connections: Not on file   Intimate Partner Violence: Not on file   Housing Stability: Not on file       Family History:   No family history on file.    Allergies:   Patient has no known allergies.    Current Medications:      Current Outpatient Medications:   •  estradioL (VIVELLE-DOT) 0.05 mg/24 hr semiweekly patch, , Disp: , Rfl:   •  estrogens, conjugated, (PREMARIN) 0.45 mg tablet, , Disp: , Rfl:   •  eszopiclone (LUNESTA) 2 mg tablet, TAKE 1 TABLET BY MOUTH ONCE DAILY TAKE IMMEDIATELY BEFORE BEDTIME, Disp: , Rfl:   •  hydrocortisone (ANUSOL-HC) 25 mg suppository, INSERT 1 SUPPOSITORY RECTALLY TWICE A DAY, Disp: , Rfl:   •  hydrocortisone (CORTEF) 5 mg tablet, , Disp: , Rfl:   •  hydroxychloroquine (PLAQUENIL) 200 mg tablet, Take 200 mg by mouth daily., Disp: , Rfl:   •  meclizine (ANTIVERT) 12.5 mg tablet, Take by mouth 3 times daily as needed., Disp: , Rfl:   •  NP THYROID 30 mg tablet, , Disp: , Rfl:   •  PARoxetine (PaxiL) 20 mg tablet, , Disp: , Rfl:   •  progesterone (PROMETRIUM) 100 mg capsule, , Disp: , Rfl:   •  progesterone 100 mg vaginal suppository, Insert 100 mg into the vagina 2 times daily., Disp: , Rfl:   •  triamcinolone (KENALOG) 0.1 % cream, APPLY 1 APPLICATION TOPICALLY TO AFFECTED AREA DAILY AS NEEDED, Disp: , Rfl:     Review of Systems  All 14 systems reviewed and the findings are not  pertinent to the current problem.    Objective    Vital Signs  There were no vitals filed for this visit.  Body mass index is 19.05 kg/m².      Physical Exam  General Appearance:  Well developed.  Well nourished.  In no acute distress.    Anorectal Examination:    No pilonidal sinus was observed.   No pilonidal cyst was observed.   Perianal skin was not erythematous.  No perianal wart was observed.      The patient had a visible anal fissure.  Specifics:  posterior fissure w/ sentinel tag.    Anus did not have a fistula.   Anal sphincter tone was normal.   No hemorrhoids were seen.   No external anal skin tags were observed.   Anus had no mass.  Rectum:  No rectal abscess was observed.   Rectal prolapse was not observed.   No rectal fistula was observed.   No rectal fluctuance was observed.  Normal angel-anal skin with no lesions or dermatitis      Problem List Items Addressed This Visit          Digestive    Chronic anal fissure - Primary     The anal fissure symptoms have persisted despite conservative management.   Risks and benefits were discussed in detail and a lateral internal sphincterotomy will be scheduled soon.    We will also do a diagnostic colonoscopy with the surgery.                RUDY Cabrera 2/25/2025 1:42 PM

## 2025-02-25 NOTE — PROGRESS NOTES
Chief Complaint:   Chief Complaint   Patient presents with    Anal Fissure       HPI     Patient is a 59 y.o. female who presents with the following:      Pain with BMs x 4 years worsening over past 1 year. Pain occurs with passing of stool and lingers after the BM for 10 minutes.  Associated symptoms include bleeding. Bleeding occurs with every BM and is noted in bowl and on tp. She was given Rx for Nifedipine for 3 months - with no improvement. Also had botox injections which offered no relief.     Has external tissue. Present over past 2 years. Gets in the way of hygiene.    Hygiene - yes   Clothing - yes   Intimacy - no   Swelling - yes; feels swollen right now. Painful to sit.     Has itching - distracting during the day. Uses wet wipes to clean up.     Currently having diarrhea x 2 months. Her doctor is doing stool tests.  Has BMs 5x per day. Previously going 1x per day.   Colonoscopy - never.  Cologuard ~8 years ago.       Medical History:   Past Medical History:   Diagnosis Date    Disease of thyroid gland        Surgical History:   Past Surgical History   Procedure Laterality Date    Breast implant removal      Hysterectomy      Vein surgery         Social History:   Social History     Socioeconomic History    Marital status:      Spouse name: Not on file    Number of children: Not on file    Years of education: Not on file    Highest education level: Not on file   Occupational History    Not on file   Tobacco Use    Smoking status: Never    Smokeless tobacco: Never   Vaping Use    Vaping status: Never Used   Substance and Sexual Activity    Alcohol use: Not Currently    Drug use: Yes     Comment: medical marijuana    Sexual activity: Defer   Other Topics Concern    Not on file   Social History Narrative    Not on file     Social Drivers of Health     Financial Resource Strain: Not on file   Food Insecurity: Not on file   Transportation Needs: Not on file   Physical Activity: Not on file    Stress: Not on file   Social Connections: Not on file   Intimate Partner Violence: Not on file   Housing Stability: Not on file       Family History:   No family history on file.    Allergies:   Patient has no known allergies.    Current Medications:      Current Outpatient Medications:     estradioL (VIVELLE-DOT) 0.05 mg/24 hr semiweekly patch, , Disp: , Rfl:     estrogens, conjugated, (PREMARIN) 0.45 mg tablet, , Disp: , Rfl:     eszopiclone (LUNESTA) 2 mg tablet, TAKE 1 TABLET BY MOUTH ONCE DAILY TAKE IMMEDIATELY BEFORE BEDTIME, Disp: , Rfl:     hydrocortisone (ANUSOL-HC) 25 mg suppository, INSERT 1 SUPPOSITORY RECTALLY TWICE A DAY, Disp: , Rfl:     hydrocortisone (CORTEF) 5 mg tablet, , Disp: , Rfl:     hydroxychloroquine (PLAQUENIL) 200 mg tablet, Take 200 mg by mouth daily., Disp: , Rfl:     meclizine (ANTIVERT) 12.5 mg tablet, Take by mouth 3 times daily as needed., Disp: , Rfl:     NP THYROID 30 mg tablet, , Disp: , Rfl:     PARoxetine (PaxiL) 20 mg tablet, , Disp: , Rfl:     progesterone (PROMETRIUM) 100 mg capsule, , Disp: , Rfl:     progesterone 100 mg vaginal suppository, Insert 100 mg into the vagina 2 times daily., Disp: , Rfl:     triamcinolone (KENALOG) 0.1 % cream, APPLY 1 APPLICATION TOPICALLY TO AFFECTED AREA DAILY AS NEEDED, Disp: , Rfl:     Review of Systems  All 14 systems reviewed and the findings are not pertinent to the current problem.    Objective     Vital Signs  There were no vitals filed for this visit.  Body mass index is 19.05 kg/m².      Physical Exam  General Appearance:  Well developed.  Well nourished.  In no acute distress.    Anorectal Examination:    No pilonidal sinus was observed.   No pilonidal cyst was observed.   Perianal skin was not erythematous.  No perianal wart was observed.      The patient had a visible anal fissure.  Specifics:  posterior fissure w/ sentinel tag.    Anus did not have a fistula.   Anal sphincter tone was normal.   No hemorrhoids were seen.   No  external anal skin tags were observed.   Anus had no mass.  Rectum:  No rectal abscess was observed.   Rectal prolapse was not observed.   No rectal fistula was observed.   No rectal fluctuance was observed.  Normal angel-anal skin with no lesions or dermatitis      Problem List Items Addressed This Visit          Digestive    Chronic anal fissure - Primary     The anal fissure symptoms have persisted despite conservative management.   Risks and benefits were discussed in detail and a lateral internal sphincterotomy will be scheduled soon.    We will also do a diagnostic colonoscopy with the surgery.                RUDY Cabrera 2/25/2025 1:42 PM

## 2025-02-25 NOTE — ASSESSMENT & PLAN NOTE
The anal fissure symptoms have persisted despite conservative management.   Risks and benefits were discussed in detail and a lateral internal sphincterotomy will be scheduled soon.    We will also do a diagnostic colonoscopy with the surgery.

## 2025-02-26 LAB
ATRIAL RATE: 49
P AXIS: 73
PR INTERVAL: 162
QRS DURATION: 94
QT INTERVAL: 426
QTC CALCULATION(BAZETT): 384
R AXIS: 39
T WAVE AXIS: -5
VENTRICULAR RATE: 49

## 2025-03-10 DIAGNOSIS — K60.1 CHRONIC ANAL FISSURE: Primary | ICD-10-CM

## 2025-03-10 PROCEDURE — 45378 DIAGNOSTIC COLONOSCOPY: CPT | Mod: 53 | Performed by: SURGERY

## 2025-03-10 RX ORDER — OXYCODONE AND ACETAMINOPHEN 5; 325 MG/1; MG/1
1 TABLET ORAL EVERY 8 HOURS PRN
Qty: 30 TABLET | Refills: 0 | Status: SHIPPED | OUTPATIENT
Start: 2025-03-10 | End: 2025-03-20

## 2025-03-10 RX ORDER — DIAZEPAM 5 MG/1
5 TABLET ORAL NIGHTLY PRN
Qty: 30 TABLET | Refills: 0 | Status: SHIPPED | OUTPATIENT
Start: 2025-03-10 | End: 2025-04-09

## 2025-03-10 RX ORDER — LIDOCAINE 50 MG/G
OINTMENT TOPICAL AS NEEDED
Qty: 35.44 G | Refills: 1 | Status: SHIPPED | OUTPATIENT
Start: 2025-03-10 | End: 2026-03-10

## 2025-03-10 RX ORDER — IBUPROFEN 600 MG/1
600 TABLET ORAL EVERY 6 HOURS PRN
Qty: 30 TABLET | Refills: 0 | Status: SHIPPED | OUTPATIENT
Start: 2025-03-10 | End: 2025-03-17

## 2025-04-07 NOTE — PROGRESS NOTES
Chief Complaint:   Chief Complaint   Patient presents with    Post-op       HPI     Patient is a 59 y.o. female who presents with the following:      Anal fissure:  02/25/25 - pain, bleed - PE: post fissure w/ tag - plan OR w/ scope  03/10/25 - OR - [facility] - Colonoscopy (incomplete - plan 2 day prep in 6-12 mo), LIS    --    Pain is better than before surgery. Still has some some pain with BMs.   Overall 60-70% better. Thinks stools have been harder this week.  Scant blood.      Medical History:   Past Medical History:   Diagnosis Date    Disease of thyroid gland        Surgical History:   Past Surgical History   Procedure Laterality Date    Breast implant removal      Hysterectomy      Vein surgery         Social History:   Social History     Socioeconomic History    Marital status:      Spouse name: Not on file    Number of children: Not on file    Years of education: Not on file    Highest education level: Not on file   Occupational History    Not on file   Tobacco Use    Smoking status: Never    Smokeless tobacco: Never   Vaping Use    Vaping status: Never Used   Substance and Sexual Activity    Alcohol use: Not Currently    Drug use: Yes     Comment: medical marijuana    Sexual activity: Defer   Other Topics Concern    Not on file   Social History Narrative    Not on file     Social Drivers of Health     Financial Resource Strain: Not on file   Food Insecurity: Not on file   Transportation Needs: Not on file   Physical Activity: Not on file   Stress: Not on file   Social Connections: Not on file   Intimate Partner Violence: Not on file   Housing Stability: Not on file       Family History:   No family history on file.    Allergies:   Patient has no known allergies.    Current Medications:      Current Outpatient Medications:     estradioL (VIVELLE-DOT) 0.05 mg/24 hr semiweekly patch, , Disp: , Rfl:     eszopiclone (LUNESTA) 2 mg tablet, TAKE 1 TABLET BY MOUTH ONCE DAILY TAKE IMMEDIATELY BEFORE BEDTIME,  Disp: , Rfl:     lidocaine (XYLOCAINE) 5 % ointment, Apply topically as needed for mild pain., Disp: 35.44 g, Rfl: 1    NP THYROID 30 mg tablet, , Disp: , Rfl:     PARoxetine (PaxiL) 20 mg tablet, , Disp: , Rfl:     progesterone (PROMETRIUM) 100 mg capsule, , Disp: , Rfl:     triamcinolone (KENALOG) 0.1 % cream, APPLY 1 APPLICATION TOPICALLY TO AFFECTED AREA DAILY AS NEEDED, Disp: , Rfl:     diazePAM (VALIUM) 5 mg tablet, Take 1 tablet (5 mg total) by mouth nightly as needed for muscle spasms. (Patient not taking: Reported on 4/8/2025), Disp: 30 tablet, Rfl: 0    estrogens, conjugated, (PREMARIN) 0.45 mg tablet, , Disp: , Rfl:     hydrocortisone (ANUSOL-HC) 25 mg suppository, INSERT 1 SUPPOSITORY RECTALLY TWICE A DAY, Disp: , Rfl:     hydrocortisone (CORTEF) 5 mg tablet, , Disp: , Rfl:     hydroxychloroquine (PLAQUENIL) 200 mg tablet, Take 200 mg by mouth daily., Disp: , Rfl:     ibuprofen (MOTRIN) 600 mg tablet, Take 1 tablet (600 mg total) by mouth every 6 (six) hours as needed for mild pain for up to 7 days., Disp: 30 tablet, Rfl: 0    meclizine (ANTIVERT) 12.5 mg tablet, Take by mouth 3 times daily as needed., Disp: , Rfl:     progesterone 100 mg vaginal suppository, Insert 100 mg into the vagina 2 times daily., Disp: , Rfl:     Review of Systems  All 14 systems reviewed and the findings are not pertinent to the current problem.    Objective     Vital Signs  There were no vitals filed for this visit.  Body mass index is 19.05 kg/m².      Physical Exam  85% healing wounds  Suture removed from L lat incision    Problem List Items Addressed This Visit          Digestive    Chronic anal fissure - Primary    The patient has recovered from their anal fissure surgery.  The wounds are healing and their symptoms are improving.  They will see me as needed.                Other    Abnormal colonoscopy    The patient recently had an incomplete colonoscopy. We will plan another colonoscopy in 6-12 months and have the patient  do a 2 day preparation.                 RUDY Cabrera 4/8/2025 3:10 PM

## 2025-04-08 ENCOUNTER — OFFICE VISIT (OUTPATIENT)
Dept: SURGERY | Facility: CLINIC | Age: 60
End: 2025-04-08
Payer: COMMERCIAL

## 2025-04-08 VITALS — BODY MASS INDEX: 18.96 KG/M2 | HEIGHT: 66 IN | WEIGHT: 118 LBS

## 2025-04-08 DIAGNOSIS — R93.3 ABNORMAL COLONOSCOPY: ICD-10-CM

## 2025-04-08 DIAGNOSIS — K60.1 CHRONIC ANAL FISSURE: Primary | ICD-10-CM

## 2025-04-08 PROCEDURE — 99024 POSTOP FOLLOW-UP VISIT: CPT

## 2025-04-08 NOTE — ASSESSMENT & PLAN NOTE
The patient recently had an incomplete colonoscopy. We will plan another colonoscopy in 6-12 months and have the patient do a 2 day preparation.

## 2025-04-08 NOTE — ASSESSMENT & PLAN NOTE
The patient has recovered from their anal fissure surgery.  The wounds are healing and their symptoms are improving.  They will see me as needed.

## 2025-04-14 ENCOUNTER — TELEPHONE (OUTPATIENT)
Dept: OPERATING ROOM | Facility: HOSPITAL | Age: 60
End: 2025-04-14
Payer: COMMERCIAL

## 2025-06-08 NOTE — PROGRESS NOTES
Chief Complaint:   Chief Complaint   Patient presents with    Rectal Pain     Int bleeding w/ BM       HPI     Patient is a 59 y.o. female who presents with the following:      Anal fissure:  02/25/25 - pain, bleed - PE: post fissure w/ tag - plan OR w/ scope  03/10/25 - OR - [facility] - Colonoscopy (incomplete - plan 2 day prep in 6-12 mo), LIS  04/08/25 - LE post op - 85% healing wounds - f/u prn    Abnormal colonoscopy:  02/25/25 - pain, bleed - PE: post fissure w/ tag - plan OR w/ scope  03/10/25 - OR - [facility] - Colonoscopy (incomplete - plan 2 day prep in 6-12 mo), LIS  04/08/25 - will schedule next scope    3 mo out from LIS and tag - still hurts to go - not as much - still beeds not as much    Every BM -     Takes dulcolax - has bM most days - harder stool is worse -     Pt has chronic Lyme dz - last 10y - was on IV ABX for 1 year - killed micro-biome -     Has itching back near bottom - worse in evening - but she sleeps    Pt has nerve pain 1/2 day after BM -     She does not have pelvic floro pain by HX    May have bladder issues / urgency / some sneeze incontinence / wears pad    I child / vaginal    Medical History:   Past Medical History:   Diagnosis Date    Disease of thyroid gland     Lyme disease        Surgical History:   Past Surgical History   Procedure Laterality Date    Breast implant removal      Hysterectomy      Vein surgery         Social History:   Social History     Socioeconomic History    Marital status:      Spouse name: Not on file    Number of children: Not on file    Years of education: Not on file    Highest education level: Not on file   Occupational History    Not on file   Tobacco Use    Smoking status: Never    Smokeless tobacco: Never   Vaping Use    Vaping status: Never Used   Substance and Sexual Activity    Alcohol use: Not Currently    Drug use: Yes     Comment: medical marijuana    Sexual activity: Defer   Other Topics Concern    Not on file   Social History Narrative     Not on file     Social Drivers of Health     Financial Resource Strain: Not on file   Food Insecurity: Not on file   Transportation Needs: Not on file   Physical Activity: Not on file   Stress: Not on file   Social Connections: Not on file   Intimate Partner Violence: Not on file   Housing Stability: Not on file       Family History:   No family history on file.    Allergies:   Patient has no known allergies.    Current Medications:      Current Outpatient Medications:     betamethasone, augmented, (DIPROLENE, AUGMENTED,) 0.05 % ointment, Apply topically 2 (two) times a day., Disp: 30 g, Rfl: 1    estradioL (VIVELLE-DOT) 0.05 mg/24 hr semiweekly patch, , Disp: , Rfl:     FLUCONAZOLE ORAL, Take 1 tablet by mouth See admin instr. Three times per week, Disp: , Rfl:     ibuprofen (MOTRIN) 600 mg tablet, Take 1 tablet (600 mg total) by mouth every 6 (six) hours as needed for mild pain for up to 7 days., Disp: 30 tablet, Rfl: 0    lidocaine (XYLOCAINE) 5 % ointment, Apply topically as needed for mild pain., Disp: 35.44 g, Rfl: 1    NP THYROID 30 mg tablet, , Disp: , Rfl:     progesterone (PROMETRIUM) 100 mg capsule, , Disp: , Rfl:     triamcinolone (KENALOG) 0.1 % cream, APPLY 1 APPLICATION TOPICALLY TO AFFECTED AREA DAILY AS NEEDED, Disp: , Rfl:     estrogens, conjugated, (PREMARIN) 0.45 mg tablet, , Disp: , Rfl:     eszopiclone (LUNESTA) 2 mg tablet, TAKE 1 TABLET BY MOUTH ONCE DAILY TAKE IMMEDIATELY BEFORE BEDTIME, Disp: , Rfl:     hydrocortisone (ANUSOL-HC) 25 mg suppository, INSERT 1 SUPPOSITORY RECTALLY TWICE A DAY, Disp: , Rfl:     hydrocortisone (CORTEF) 5 mg tablet, , Disp: , Rfl:     hydroxychloroquine (PLAQUENIL) 200 mg tablet, Take 200 mg by mouth daily., Disp: , Rfl:     meclizine (ANTIVERT) 12.5 mg tablet, Take by mouth 3 times daily as needed., Disp: , Rfl:     progesterone 100 mg vaginal suppository, Insert 100 mg into the vagina 2 times daily., Disp: , Rfl:     Review of Systems  All 14 systems reviewed  and the findings are not pertinent to the current problem.    Objective     Vital Signs  There were no vitals filed for this visit.  Body mass index is 19.37 kg/m².      Physical Exam  General Appearance:  Well developed.  Well nourished.  In no acute distress.    Anorectal Examination:    No pilonidal sinus was observed.   No pilonidal cyst was observed.   Perianal skin was not erythematous.  No perianal wart was observed.  No anal fissure was observed.   Anus did not have a fistula.   Anal sphincter tone was normal.   No hemorrhoids were seen.   No external anal skin tags were observed.   Anus had no mass.  Rectum:  No rectal abscess was observed.   Rectal prolapse was not observed.   No rectal fistula was observed.   Rectal exam was not tender.   No rectal fluctuance was observed.  Rectal exam showed no mass.       Skin:  Dermatitis was seen on the angel-anal skin.  Specifics:  very white cracked skin for 1/2 cm at anal verge  No clear anal fissure.      Problem List Items Addressed This Visit       Dermatitis of perianal region - Primary    She has some pain and bleeding 3 months out from a standard LIS for anal fissure.  On exam today she has white and cracked skin within about 1/2 cm of the anus.  My plan is strong steroids to the area and I will see her in 6 weeks.         Relevant Medications    betamethasone, augmented, (DIPROLENE, AUGMENTED,) 0.05 % ointment           Jim Tilley MD 6/10/2025 11:14 AM

## 2025-06-10 ENCOUNTER — OFFICE VISIT (OUTPATIENT)
Dept: SURGERY | Facility: CLINIC | Age: 60
End: 2025-06-10
Payer: COMMERCIAL

## 2025-06-10 VITALS — WEIGHT: 120 LBS | HEIGHT: 66 IN | BODY MASS INDEX: 19.29 KG/M2

## 2025-06-10 DIAGNOSIS — L30.9 DERMATITIS OF PERIANAL REGION: Primary | ICD-10-CM

## 2025-06-10 PROCEDURE — 99024 POSTOP FOLLOW-UP VISIT: CPT | Performed by: SURGERY

## 2025-06-10 RX ORDER — BETAMETHASONE DIPROPIONATE 0.5 MG/G
OINTMENT, AUGMENTED TOPICAL 2 TIMES DAILY
Qty: 30 G | Refills: 1 | Status: SHIPPED | OUTPATIENT
Start: 2025-06-10 | End: 2026-06-10

## 2025-06-10 NOTE — LETTER
Brenda 10, 2025     Lola Dahl, DO  [address]  [address]  [facility] [address]    Patient: Cleo Paulson  YOB: 1965  Date of Visit: 6/10/2025      Dear Dr. Dahl:    Thank you for referring Cleo Paulson to me for evaluation. Below are my notes for this consultation.    If you have questions, please do not hesitate to call me. I look forward to following your patient along with you.         Sincerely,        Jim Tilley MD        CC: No Recipients    Jim Tilley MD  6/10/2025 11:14 AM  Sign when Signing Visit      Chief Complaint:   Chief Complaint   Patient presents with   • Rectal Pain     Int bleeding w/ BM       HPI    Patient is a 59 y.o. female who presents with the following:      Anal fissure:  02/25/25 - pain, bleed - PE: post fissure w/ tag - plan OR w/ scope  03/10/25 - OR - [facility] - Colonoscopy (incomplete - plan 2 day prep in 6-12 mo), LIS  04/08/25 - LE post op - 85% healing wounds - f/u prn    Abnormal colonoscopy:  02/25/25 - pain, bleed - PE: post fissure w/ tag - plan OR w/ scope  03/10/25 - OR - [facility] - Colonoscopy (incomplete - plan 2 day prep in 6-12 mo), LIS  04/08/25 - will schedule next scope    3 mo out from LIS and tag - still hurts to go - not as much - still beeds not as much    Every BM -     Takes dulcolax - has bM most days - harder stool is worse -     Pt has chronic Lyme dz - last 10y - was on IV ABX for 1 year - killed micro-biome -     Has itching back near bottom - worse in evening - but she sleeps    Pt has nerve pain 1/2 day after BM -     She does not have pelvic floro pain by HX    May have bladder issues / urgency / some sneeze incontinence / wears pad    I child / vaginal    Medical History:   Past Medical History:   Diagnosis Date   • Disease of thyroid gland    • Lyme disease        Surgical History:   Past Surgical History   Procedure Laterality Date   • Breast implant removal     • Hysterectomy     • Vein surgery          Social History:   Social History     Socioeconomic History   • Marital status:      Spouse name: Not on file   • Number of children: Not on file   • Years of education: Not on file   • Highest education level: Not on file   Occupational History   • Not on file   Tobacco Use   • Smoking status: Never   • Smokeless tobacco: Never   Vaping Use   • Vaping status: Never Used   Substance and Sexual Activity   • Alcohol use: Not Currently   • Drug use: Yes     Comment: medical marijuana   • Sexual activity: Defer   Other Topics Concern   • Not on file   Social History Narrative   • Not on file     Social Drivers of Health     Financial Resource Strain: Not on file   Food Insecurity: Not on file   Transportation Needs: Not on file   Physical Activity: Not on file   Stress: Not on file   Social Connections: Not on file   Intimate Partner Violence: Not on file   Housing Stability: Not on file       Family History:   No family history on file.    Allergies:   Patient has no known allergies.    Current Medications:      Current Outpatient Medications:   •  betamethasone, augmented, (DIPROLENE, AUGMENTED,) 0.05 % ointment, Apply topically 2 (two) times a day., Disp: 30 g, Rfl: 1  •  estradioL (VIVELLE-DOT) 0.05 mg/24 hr semiweekly patch, , Disp: , Rfl:   •  FLUCONAZOLE ORAL, Take 1 tablet by mouth See admin instr. Three times per week, Disp: , Rfl:   •  ibuprofen (MOTRIN) 600 mg tablet, Take 1 tablet (600 mg total) by mouth every 6 (six) hours as needed for mild pain for up to 7 days., Disp: 30 tablet, Rfl: 0  •  lidocaine (XYLOCAINE) 5 % ointment, Apply topically as needed for mild pain., Disp: 35.44 g, Rfl: 1  •  NP THYROID 30 mg tablet, , Disp: , Rfl:   •  progesterone (PROMETRIUM) 100 mg capsule, , Disp: , Rfl:   •  triamcinolone (KENALOG) 0.1 % cream, APPLY 1 APPLICATION TOPICALLY TO AFFECTED AREA DAILY AS NEEDED, Disp: , Rfl:   •  estrogens, conjugated, (PREMARIN) 0.45 mg tablet, , Disp: , Rfl:   •   eszopiclone (LUNESTA) 2 mg tablet, TAKE 1 TABLET BY MOUTH ONCE DAILY TAKE IMMEDIATELY BEFORE BEDTIME, Disp: , Rfl:   •  hydrocortisone (ANUSOL-HC) 25 mg suppository, INSERT 1 SUPPOSITORY RECTALLY TWICE A DAY, Disp: , Rfl:   •  hydrocortisone (CORTEF) 5 mg tablet, , Disp: , Rfl:   •  hydroxychloroquine (PLAQUENIL) 200 mg tablet, Take 200 mg by mouth daily., Disp: , Rfl:   •  meclizine (ANTIVERT) 12.5 mg tablet, Take by mouth 3 times daily as needed., Disp: , Rfl:   •  progesterone 100 mg vaginal suppository, Insert 100 mg into the vagina 2 times daily., Disp: , Rfl:     Review of Systems  All 14 systems reviewed and the findings are not pertinent to the current problem.    Objective    Vital Signs  There were no vitals filed for this visit.  Body mass index is 19.37 kg/m².      Physical Exam  General Appearance:  Well developed.  Well nourished.  In no acute distress.    Anorectal Examination:    No pilonidal sinus was observed.   No pilonidal cyst was observed.   Perianal skin was not erythematous.  No perianal wart was observed.  No anal fissure was observed.   Anus did not have a fistula.   Anal sphincter tone was normal.   No hemorrhoids were seen.   No external anal skin tags were observed.   Anus had no mass.  Rectum:  No rectal abscess was observed.   Rectal prolapse was not observed.   No rectal fistula was observed.   Rectal exam was not tender.   No rectal fluctuance was observed.  Rectal exam showed no mass.       Skin:  Dermatitis was seen on the angel-anal skin.  Specifics:  very white cracked skin for 1/2 cm at anal verge  No clear anal fissure.      Problem List Items Addressed This Visit       Dermatitis of perianal region - Primary    She has some pain and bleeding 3 months out from a standard LIS for anal fissure.  On exam today she has white and cracked skin within about 1/2 cm of the anus.  My plan is strong steroids to the area and I will see her in 6 weeks.         Relevant Medications     betamethasone, augmented, (DIPROLENE, AUGMENTED,) 0.05 % ointment           Jim Tilley MD 6/10/2025 11:14 AM

## 2025-06-10 NOTE — ASSESSMENT & PLAN NOTE
She has some pain and bleeding 3 months out from a standard LIS for anal fissure.  On exam today she has white and cracked skin within about 1/2 cm of the anus.  My plan is strong steroids to the area and I will see her in 6 weeks.

## 2025-07-19 ENCOUNTER — VBI (OUTPATIENT)
Dept: ADMINISTRATIVE | Facility: OTHER | Age: 60
End: 2025-07-19

## 2025-07-20 PROBLEM — F32.A DEPRESSION: Status: ACTIVE | Noted: 2025-07-20

## 2025-07-20 PROBLEM — A69.20 LYME DISEASE: Status: ACTIVE | Noted: 2025-07-20

## 2025-07-20 PROBLEM — M53.3 SACROILIAC JOINT PAIN: Status: ACTIVE | Noted: 2025-07-20

## 2025-07-20 PROBLEM — M85.852 OSTEOPENIA OF LEFT HIP: Status: ACTIVE | Noted: 2025-07-20

## 2025-07-20 PROBLEM — R26.89 IMPAIRMENT OF BALANCE: Status: ACTIVE | Noted: 2025-07-20

## 2025-07-20 PROBLEM — R29.898 WEAKNESS OF LEFT HIP: Status: ACTIVE | Noted: 2025-07-20

## 2025-07-20 PROBLEM — E27.40 ADRENAL INSUFFICIENCY (HCC): Status: ACTIVE | Noted: 2025-07-20

## 2025-07-20 PROBLEM — R07.9 CHEST PAIN: Status: ACTIVE | Noted: 2025-07-20

## 2025-07-20 PROBLEM — M47.816 DEGENERATIVE JOINT DISEASE (DJD) OF LUMBAR SPINE: Status: ACTIVE | Noted: 2025-07-20

## 2025-07-20 PROBLEM — A69.29 OTHER CONDITIONS ASSOCIATED WITH LYME DISEASE: Status: ACTIVE | Noted: 2025-07-20

## 2025-07-20 PROBLEM — K64.9 HEMORRHOIDS: Status: ACTIVE | Noted: 2025-07-20

## 2025-07-20 PROBLEM — G89.29 CHRONIC PAIN: Status: ACTIVE | Noted: 2025-07-20

## 2025-07-20 PROBLEM — G43.909 MIGRAINE: Status: ACTIVE | Noted: 2025-07-20

## 2025-07-20 PROBLEM — M47.816 LUMBAR SPONDYLOSIS: Status: ACTIVE | Noted: 2025-07-20

## 2025-07-20 PROBLEM — E03.9 HYPOTHYROIDISM: Status: ACTIVE | Noted: 2025-07-20

## 2025-07-20 PROBLEM — S31.839A OPEN WOUND OF ANUS: Status: ACTIVE | Noted: 2025-07-20

## 2025-07-20 PROBLEM — F41.9 ANXIETY: Status: ACTIVE | Noted: 2025-07-20

## 2025-07-20 PROBLEM — M67.959 TENDINOPATHY OF GLUTEUS MEDIUS: Status: ACTIVE | Noted: 2025-07-20

## 2025-07-20 PROBLEM — Z86.19: Status: ACTIVE | Noted: 2025-07-20

## 2025-07-20 PROBLEM — S76.019A: Status: ACTIVE | Noted: 2025-07-20

## 2025-07-20 PROBLEM — G47.00 INSOMNIA: Status: ACTIVE | Noted: 2025-07-20

## 2025-07-20 PROBLEM — I45.10 INCOMPLETE RIGHT BUNDLE BRANCH BLOCK: Status: ACTIVE | Noted: 2025-07-20

## 2025-07-20 NOTE — TELEPHONE ENCOUNTER
Upon review of the In Basket request we were able to locate, review, and update the patient chart as requested for CRC: Cologuard, CRC: Colonoscopy, and Mammogram.    Any additional questions or concerns should be emailed to the Practice Liaisons via the appropriate education email address, please do not reply via In Basket.    Thank you  Ana Dowd   [facility]

## 2025-09-02 ENCOUNTER — OFFICE VISIT (OUTPATIENT)
Dept: SURGERY | Facility: CLINIC | Age: 60
End: 2025-09-02
Payer: COMMERCIAL

## 2025-09-02 VITALS — BODY MASS INDEX: 19.29 KG/M2 | WEIGHT: 120 LBS | HEIGHT: 66 IN

## 2025-09-02 DIAGNOSIS — K60.1 CHRONIC ANAL FISSURE: Primary | ICD-10-CM

## 2025-09-02 PROCEDURE — 99024 POSTOP FOLLOW-UP VISIT: CPT | Performed by: SURGERY

## 2025-09-02 RX ORDER — ATOGEPANT 60 MG/1
1 TABLET ORAL DAILY
COMMUNITY
Start: 2025-08-29

## 2025-09-02 RX ORDER — DUPILUMAB 300 MG/2ML
1 INJECTION, SOLUTION SUBCUTANEOUS
COMMUNITY
Start: 2025-08-22